# Patient Record
Sex: FEMALE | Race: WHITE
[De-identification: names, ages, dates, MRNs, and addresses within clinical notes are randomized per-mention and may not be internally consistent; named-entity substitution may affect disease eponyms.]

---

## 2018-12-08 ENCOUNTER — HOSPITAL ENCOUNTER (EMERGENCY)
Dept: HOSPITAL 31 - C.ER | Age: 58
Discharge: HOME | End: 2018-12-08
Payer: COMMERCIAL

## 2018-12-08 VITALS — RESPIRATION RATE: 18 BRPM

## 2018-12-08 VITALS — HEART RATE: 87 BPM | SYSTOLIC BLOOD PRESSURE: 118 MMHG | DIASTOLIC BLOOD PRESSURE: 64 MMHG | TEMPERATURE: 98 F

## 2018-12-08 VITALS — OXYGEN SATURATION: 100 %

## 2018-12-08 DIAGNOSIS — F41.9: Primary | ICD-10-CM

## 2018-12-08 LAB
BASOPHILS # BLD AUTO: 0.1 K/UL (ref 0–0.2)
BASOPHILS NFR BLD: 0.7 % (ref 0–2)
BILIRUB UR-MCNC: NEGATIVE MG/DL
BUN SERPL-MCNC: 13 MG/DL (ref 7–17)
CALCIUM SERPL-MCNC: 9.2 MG/DL (ref 8.6–10.4)
EOSINOPHIL # BLD AUTO: 0 K/UL (ref 0–0.7)
EOSINOPHIL NFR BLD: 0.1 % (ref 0–4)
ERYTHROCYTE [DISTWIDTH] IN BLOOD BY AUTOMATED COUNT: 12.6 % (ref 11.5–14.5)
GFR NON-AFRICAN AMERICAN: > 60
GLUCOSE UR STRIP-MCNC: NORMAL MG/DL
HGB BLD-MCNC: 14.5 G/DL (ref 11–16)
LEUKOCYTE ESTERASE UR-ACNC: (no result) LEU/UL
LIPASE: 87 U/L (ref 23–300)
LYMPHOCYTES # BLD AUTO: 1.1 K/UL (ref 1–4.3)
LYMPHOCYTES NFR BLD AUTO: 12.5 % (ref 20–40)
MCH RBC QN AUTO: 31 PG (ref 27–31)
MCHC RBC AUTO-ENTMCNC: 34.3 G/DL (ref 33–37)
MCV RBC AUTO: 90.2 FL (ref 81–99)
MONOCYTES # BLD: 0.4 K/UL (ref 0–0.8)
MONOCYTES NFR BLD: 4.3 % (ref 0–10)
NEUTROPHILS # BLD: 7.3 K/UL (ref 1.8–7)
NEUTROPHILS NFR BLD AUTO: 82.4 % (ref 50–75)
NRBC BLD AUTO-RTO: 0 % (ref 0–2)
PH UR STRIP: 8 [PH] (ref 5–8)
PLATELET # BLD: 218 K/UL (ref 130–400)
PMV BLD AUTO: 9.7 FL (ref 7.2–11.7)
PROT UR STRIP-MCNC: NEGATIVE MG/DL
RBC # BLD AUTO: 4.67 MIL/UL (ref 3.8–5.2)
RBC # UR STRIP: NEGATIVE /UL
SP GR UR STRIP: 1.01 (ref 1–1.03)
UROBILINOGEN UR-MCNC: NORMAL MG/DL (ref 0.2–1)
WBC # BLD AUTO: 8.8 K/UL (ref 4.8–10.8)

## 2018-12-08 PROCEDURE — 85025 COMPLETE CBC W/AUTO DIFF WBC: CPT

## 2018-12-08 PROCEDURE — 96374 THER/PROPH/DIAG INJ IV PUSH: CPT

## 2018-12-08 PROCEDURE — 80361 OPIATES 1 OR MORE: CPT

## 2018-12-08 PROCEDURE — 80358 DRUG SCREENING METHADONE: CPT

## 2018-12-08 PROCEDURE — 80324 DRUG SCREEN AMPHETAMINES 1/2: CPT

## 2018-12-08 PROCEDURE — 81001 URINALYSIS AUTO W/SCOPE: CPT

## 2018-12-08 PROCEDURE — 83690 ASSAY OF LIPASE: CPT

## 2018-12-08 PROCEDURE — 96375 TX/PRO/DX INJ NEW DRUG ADDON: CPT

## 2018-12-08 PROCEDURE — 80349 CANNABINOIDS NATURAL: CPT

## 2018-12-08 PROCEDURE — 80346 BENZODIAZEPINES1-12: CPT

## 2018-12-08 PROCEDURE — 80048 BASIC METABOLIC PNL TOTAL CA: CPT

## 2018-12-08 PROCEDURE — 80353 DRUG SCREENING COCAINE: CPT

## 2018-12-08 PROCEDURE — 80345 DRUG SCREENING BARBITURATES: CPT

## 2018-12-08 PROCEDURE — 83992 ASSAY FOR PHENCYCLIDINE: CPT

## 2018-12-08 PROCEDURE — 99284 EMERGENCY DEPT VISIT MOD MDM: CPT

## 2018-12-08 PROCEDURE — 93005 ELECTROCARDIOGRAM TRACING: CPT

## 2018-12-08 PROCEDURE — 96361 HYDRATE IV INFUSION ADD-ON: CPT

## 2018-12-08 NOTE — C.PDOC
History Of Present Illness


57 y/o female pt otherwise well with hx of anxiety presents to the ER 

complaining of nausea associated with abdominal pain, dizziness and paresthesia 

on hands and feet. Pt reports she was at work and left after 1 and a half hours 

due to feeling nauseous, abdominal pain and paresthesia. Pt denies headache., 

fever, chills and vomiting. Pt works as an .


Time Seen by Provider: 12/08/18 13:39


Chief Complaint (Nursing): Anxiety


History Per: Patient


History/Exam Limitations: no limitations


Onset/Duration Of Symptoms: Days


Current Symptoms Are (Timing): Still Present


Suicide/Self Injury Attempted (Context): None


Modifying Factor(s): None


Severity: None





Past Medical History


Reviewed: Historical Data, Nursing Documentation, Vital Signs


Vital Signs: 





                                Last Vital Signs











Temp  97.5 F L  12/08/18 13:33


 


Pulse  82   12/08/18 13:33


 


Resp  18   12/08/18 13:33


 


BP  123/69   12/08/18 13:33


 


Pulse Ox  100   12/08/18 13:33














- Medical History


PMH: Anxiety


Family History: States: Unknown Family Hx





- Social History


Hx Alcohol Use: No


Hx Substance Use: No





- Immunization History


Hx Tetanus Toxoid Vaccination: No


Hx Influenza Vaccination: No


Hx Pneumococcal Vaccination: No





Review Of Systems


Except As Marked, All Systems Reviewed And Found Negative.


Constitutional: Negative for: Fever, Chills


Gastrointestinal: Positive for: Nausea, Abdominal Pain.  Negative for: Vomiting


Neurological: Positive for: Dizziness, Other (paresthesia on hands and feet).  

Negative for: Headache





Physical Exam





- Physical Exam


Appears: Non-toxic, Other (disturbed; anxious; hyperventilating )


Chest: Symmetrical


Cardiovascular: Rhythm Regular


Respiratory: Normal Breath Sounds


Gastrointestinal/Abdominal: Soft, Tenderness (diffused discomfort on deep 

palpation), No Distention, No Guarding, No Rebound


Extremity: Normal ROM (x4)


Neurological/Psych: Oriented x3, Normal Speech





ED Course And Treatment





- Laboratory Results


Result Diagrams: 


                                 12/08/18 14:56





                                 12/08/18 14:56


O2 Sat by Pulse Oximetry: 100 (RA)


Pulse Ox Interpretation: Normal





Medical Decision Making


Medical Decision Making: 


Plans: 


-- chem labs 


-- blood work 


-- Zofran 


-- IV fluids 


-- Ativan 


-- UA 


Reassess: Pt is feeling much better. Pt will be prescribed with a few Ativan and

will f/u with her PMD. 





Disposition


Counseled Patient/Family Regarding: Diagnosis, Need For Followup, Rx Given





- Disposition


Disposition: HOME/ ROUTINE


Disposition Time: 17:04


Condition: STABLE


Additional Instructions: 


Follow up with your doctor and possibly a psychiatrist. 


Prescriptions: 


LORazepam [Ativan] 1 mg PO ONCE #5 tab


Instructions:  Anxiety, Adult (DC)


Forms:  Gen Discharge Inst [facility], [facility] (Divehi), Work Excuse





- POA


Present On Arrival: None





- Clinical Impression


Clinical Impression: 


 Anxiety








- Scribe Statement


The provider has reviewed the documentation as recorded by the Brisa[facility]thalia


Live Do


Provider Attestation: 


All medical record entries made by the Scribe were at my direction and 

personally dictated by me. I have reviewed the chart and agree that the record 

accurately reflects my personal performance of the history, physical exam, 

medical decision making, and the department course for this patient. I have also

 personally directed, reviewed, and agree with the discharge instructions and 

disposition.

## 2018-12-10 NOTE — CARD
--------------- APPROVED REPORT --------------





Date of service: 12/08/2018



EKG Measurement

Heart [id number]

[id number]

[id number]

[id number]

[id number]



<Conclusion>

Normal sinus rhythm

Incomplete right bundle branch block

Borderline ECG